# Patient Record
Sex: MALE | Race: WHITE | NOT HISPANIC OR LATINO | URBAN - METROPOLITAN AREA
[De-identification: names, ages, dates, MRNs, and addresses within clinical notes are randomized per-mention and may not be internally consistent; named-entity substitution may affect disease eponyms.]

---

## 2017-05-04 ENCOUNTER — EMERGENCY (EMERGENCY)
Facility: HOSPITAL | Age: 47
LOS: 0 days | Discharge: HOME | End: 2017-05-04
Attending: EMERGENCY MEDICINE | Admitting: EMERGENCY MEDICINE
Payer: COMMERCIAL

## 2017-05-04 VITALS
SYSTOLIC BLOOD PRESSURE: 112 MMHG | RESPIRATION RATE: 14 BRPM | OXYGEN SATURATION: 100 % | TEMPERATURE: 99 F | HEART RATE: 70 BPM | HEIGHT: 70 IN | WEIGHT: 187.39 LBS | DIASTOLIC BLOOD PRESSURE: 68 MMHG

## 2017-05-04 DIAGNOSIS — S61.411A LACERATION WITHOUT FOREIGN BODY OF RIGHT HAND, INITIAL ENCOUNTER: ICD-10-CM
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
## 2017-05-04 PROCEDURE — 99282 EMERGENCY DEPT VISIT SF MDM: CPT

## 2017-05-04 PROCEDURE — 12001 RPR S/N/AX/GEN/TRNK 2.5CM/<: CPT

## 2017-05-04 RX ORDER — CEPHALEXIN 500 MG
1 CAPSULE ORAL
Qty: 20 | Refills: 0 | OUTPATIENT
Start: 2017-05-04 | End: 2017-05-09

## 2017-05-04 RX ORDER — TETANUS TOXOID, REDUCED DIPHTHERIA TOXOID AND ACELLULAR PERTUSSIS VACCINE, ADSORBED 5; 2.5; 8; 8; 2.5 [IU]/.5ML; [IU]/.5ML; UG/.5ML; UG/.5ML; UG/.5ML
0.5 SUSPENSION INTRAMUSCULAR ONCE
Qty: 0 | Refills: 0 | Status: COMPLETED | OUTPATIENT
Start: 2017-05-04 | End: 2017-05-04

## 2017-05-04 RX ORDER — CEPHALEXIN 500 MG
500 CAPSULE ORAL
Qty: 0 | Refills: 0 | Status: DISCONTINUED | OUTPATIENT
Start: 2017-05-04 | End: 2017-05-04

## 2017-05-04 RX ADMIN — TETANUS TOXOID, REDUCED DIPHTHERIA TOXOID AND ACELLULAR PERTUSSIS VACCINE, ADSORBED 0.5 MILLILITER(S): 5; 2.5; 8; 8; 2.5 SUSPENSION INTRAMUSCULAR at 21:10

## 2017-05-04 RX ADMIN — Medication 500 MILLIGRAM(S): at 21:46

## 2017-05-04 NOTE — ED STATDOCS - OBJECTIVE STATEMENT
46 y/o M presents to the ED c/o left hand injury. The pt provides that he was cutting in the kitchen and lacerated over the Dorsal thenar eminence. No h/o other trauma, abd pain, nvd, headache, fever, chills, dizziness, cp, cough, sob, or urinary incontinence.

## 2017-05-04 NOTE — ED STATDOCS - PROGRESS NOTE DETAILS
Patient seen and evaluated, hand with FROM and strength, 2cm laceration over the dorsal webspace between thumb and index, no muscle or tendon involvement, wound closed, wound care reviewed, patient tolerated well -Jossie Quiroz PA-C

## 2017-05-04 NOTE — ED STATDOCS - DETAILS:
I, Alen Severino, performed the initial face to face bedside interview with this patient regarding history of present illness, review of symptoms and relevant past medical, social and family history.  I completed an independent physical examination.  I was the initial provider who evaluated this patient. I have signed out the follow up of any pending tests (i.e. labs, radiological studies) to the ACP.  I have communicated the patient’s plan of care and disposition with the ACP.  The history, relevant review of systems, past medical and surgical history, medical decision making, and physical examination was documented by the scribe in my presence and I attest to the accuracy of the documentation.

## 2017-05-04 NOTE — ED STATDOCS - NS ED MD SCRIBE ATTENDING SCRIBE SECTIONS
OBSERVATION MONITORING PLAN/CONSULTATIONS/SHIFT CHANGE/PROGRESS NOTE/INTAKE ASSESSMENT/SCREENINGS/HISTORY OF PRESENT ILLNESS/DISPOSITION/RESULTS/HIV/PHYSICAL EXAM/PAST MEDICAL/SURGICAL/SOCIAL HISTORY/REVIEW OF SYSTEMS/VITAL SIGNS( Pullset)

## 2017-05-04 NOTE — ED STATDOCS - CHPI ED SYMPTOM NEG
no blood in stool/no fever/no hematuria/no burning urination/no palpitations/no nausea/no dysuria/no diarrhea/no abdominal distention/no vomiting

## 2017-05-04 NOTE — ED STATDOCS - CARE PLAN
Principal Discharge DX:	Laceration of right hand, foreign body presence unspecified, initial encounter

## 2017-05-04 NOTE — ED STATDOCS - SKIN, MLM
skin normal color for race, warm, dry and intact. 1cm lac over left dorsal surface of thenar eminence.

## 2017-05-04 NOTE — ED PROCEDURE NOTE - CPROC ED POST PROC CARE GUIDE1
Keep the cast/splint/dressing clean and dry./Instructed patient/caregiver regarding signs and symptoms of infection.
